# Patient Record
Sex: FEMALE | Race: WHITE | NOT HISPANIC OR LATINO | Employment: UNEMPLOYED | URBAN - METROPOLITAN AREA
[De-identification: names, ages, dates, MRNs, and addresses within clinical notes are randomized per-mention and may not be internally consistent; named-entity substitution may affect disease eponyms.]

---

## 2024-06-12 ENCOUNTER — TRANSCRIBE ORDERS (OUTPATIENT)
Dept: ADMINISTRATIVE | Facility: HOSPITAL | Age: 59
End: 2024-06-12
Payer: COMMERCIAL

## 2024-06-12 DIAGNOSIS — M54.16 LUMBAR RADICULOPATHY: Primary | ICD-10-CM

## 2024-06-13 ENCOUNTER — OFFICE VISIT (OUTPATIENT)
Dept: FAMILY MEDICINE CLINIC | Age: 59
End: 2024-06-13
Payer: COMMERCIAL

## 2024-06-13 ENCOUNTER — LAB (OUTPATIENT)
Dept: LAB | Facility: HOSPITAL | Age: 59
End: 2024-06-13
Payer: COMMERCIAL

## 2024-06-13 VITALS
WEIGHT: 117.6 LBS | DIASTOLIC BLOOD PRESSURE: 90 MMHG | BODY MASS INDEX: 20.84 KG/M2 | HEART RATE: 96 BPM | SYSTOLIC BLOOD PRESSURE: 150 MMHG | HEIGHT: 63 IN

## 2024-06-13 DIAGNOSIS — E55.9 VITAMIN D DEFICIENCY: ICD-10-CM

## 2024-06-13 DIAGNOSIS — R79.89 ELEVATED FERRITIN: ICD-10-CM

## 2024-06-13 DIAGNOSIS — E53.8 VITAMIN B12 DEFICIENCY: ICD-10-CM

## 2024-06-13 DIAGNOSIS — D45 POLYCYTHEMIA VERA: ICD-10-CM

## 2024-06-13 DIAGNOSIS — F34.1 DYSTHYMIA: Primary | ICD-10-CM

## 2024-06-13 DIAGNOSIS — R03.0 ELEVATED BLOOD PRESSURE READING: ICD-10-CM

## 2024-06-13 DIAGNOSIS — J43.9 PULMONARY EMPHYSEMA, UNSPECIFIED EMPHYSEMA TYPE: ICD-10-CM

## 2024-06-13 LAB
25(OH)D3 SERPL-MCNC: 40.4 NG/ML (ref 30–100)
BASOPHILS # BLD AUTO: 0.08 10*3/MM3 (ref 0–0.2)
BASOPHILS NFR BLD AUTO: 1.4 % (ref 0–1.5)
DEPRECATED RDW RBC AUTO: 54.7 FL (ref 37–54)
EOSINOPHIL # BLD AUTO: 0.07 10*3/MM3 (ref 0–0.4)
EOSINOPHIL NFR BLD AUTO: 1.2 % (ref 0.3–6.2)
ERYTHROCYTE [DISTWIDTH] IN BLOOD BY AUTOMATED COUNT: 13.2 % (ref 12.3–15.4)
FERRITIN SERPL-MCNC: 200 NG/ML (ref 13–150)
HCT VFR BLD AUTO: 49.8 % (ref 34–46.6)
HGB BLD-MCNC: 16.7 G/DL (ref 12–15.9)
IMM GRANULOCYTES # BLD AUTO: 0.02 10*3/MM3 (ref 0–0.05)
IMM GRANULOCYTES NFR BLD AUTO: 0.3 % (ref 0–0.5)
IRON 24H UR-MRATE: 132 MCG/DL (ref 37–145)
IRON SATN MFR SERPL: 33 % (ref 20–50)
LYMPHOCYTES # BLD AUTO: 1.45 10*3/MM3 (ref 0.7–3.1)
LYMPHOCYTES NFR BLD AUTO: 24.7 % (ref 19.6–45.3)
MCH RBC QN AUTO: 36.4 PG (ref 26.6–33)
MCHC RBC AUTO-ENTMCNC: 33.5 G/DL (ref 31.5–35.7)
MCV RBC AUTO: 108.5 FL (ref 79–97)
MONOCYTES # BLD AUTO: 0.69 10*3/MM3 (ref 0.1–0.9)
MONOCYTES NFR BLD AUTO: 11.8 % (ref 5–12)
NEUTROPHILS NFR BLD AUTO: 3.56 10*3/MM3 (ref 1.7–7)
NEUTROPHILS NFR BLD AUTO: 60.6 % (ref 42.7–76)
PLATELET # BLD AUTO: 258 10*3/MM3 (ref 140–450)
PMV BLD AUTO: 9.7 FL (ref 6–12)
RBC # BLD AUTO: 4.59 10*6/MM3 (ref 3.77–5.28)
TIBC SERPL-MCNC: 395 MCG/DL (ref 298–536)
TRANSFERRIN SERPL-MCNC: 265 MG/DL (ref 200–360)
VIT B12 BLD-MCNC: 520 PG/ML (ref 211–946)
WBC NRBC COR # BLD AUTO: 5.87 10*3/MM3 (ref 3.4–10.8)

## 2024-06-13 PROCEDURE — 82728 ASSAY OF FERRITIN: CPT

## 2024-06-13 PROCEDURE — 82607 VITAMIN B-12: CPT

## 2024-06-13 PROCEDURE — 82306 VITAMIN D 25 HYDROXY: CPT

## 2024-06-13 PROCEDURE — 85025 COMPLETE CBC W/AUTO DIFF WBC: CPT

## 2024-06-13 PROCEDURE — 36415 COLL VENOUS BLD VENIPUNCTURE: CPT

## 2024-06-13 PROCEDURE — 84466 ASSAY OF TRANSFERRIN: CPT

## 2024-06-13 PROCEDURE — 99214 OFFICE O/P EST MOD 30 MIN: CPT | Performed by: NURSE PRACTITIONER

## 2024-06-13 PROCEDURE — 83540 ASSAY OF IRON: CPT

## 2024-06-13 RX ORDER — DULOXETIN HYDROCHLORIDE 30 MG/1
30 CAPSULE, DELAYED RELEASE ORAL DAILY
Qty: 90 CAPSULE | Refills: 1 | Status: SHIPPED | OUTPATIENT
Start: 2024-06-13

## 2024-06-13 NOTE — PROGRESS NOTES
"Isha Prieto presents to [facility] with complaint of  Depression (1 month follow up )    SUBJECTIVE  History of Present Illness    Patient is here for 1 month follow-up of depression.  At her last visit, she was started on Cymbalta 30 mg daily.  This medication was also selected due to help target her chronic pain.  Patient says that it has helped her mood tremendously.  She was happier overall, not having depressive thoughts.  Seems to be sleeping somewhat better as well.  She does not see any improvement in her pain however.  Patient does seem to be more calm in office today. Blood pressure is elevated today in office.  Patient says that she smoked right before coming in to have her blood pressure checked and also drink Mountain Dew.    Patient had labs completed 1 month ago when she establish care.  Her labs showed a severe vitamin D deficiency with a level of 8.  She has started vitamin D supplementation.  She does mention that she accidentally took all 4 pills in 1 week as she did not realize she was supposed to only take medication weekly.  Patient was also found to have elevated ferritin level at 270.  Total iron was 253 and iron sat was 60%.  Her hemoglobin was 16.7 hematocrit 49.1 .4.  B12 on lower side of normal at 251.  She was started on B12 1000 mcg oral daily replacement.     Patient was also given albuterol for her shortness of air and emphysema.  Patient says that she has seen significant improvement in her breathing as well and is not experiencing as much wheezing or shortness of air.    Patient has also established with pain management, Jessi Chen.  Will be having spine MRI in the future.  Also has appointment with cardiology in July for enlarged aorta workup.      OBJECTIVE  Vital Signs:   /90   Pulse 96   Ht 160 cm (63\")   Wt 53.3 kg (117 lb 9.6 oz)   BMI 20.83 kg/m²       Physical Exam  Vitals reviewed.   Constitutional:       General: " She is not in acute distress.     Appearance: Normal appearance. She is not ill-appearing.   HENT:      Head: Normocephalic and atraumatic.      Nose: Nose normal.      Mouth/Throat:      Mouth: Mucous membranes are moist.      Pharynx: Oropharynx is clear.   Cardiovascular:      Rate and Rhythm: Normal rate and regular rhythm.      Pulses: Normal pulses.      Heart sounds: Normal heart sounds.   Pulmonary:      Effort: Pulmonary effort is normal.      Breath sounds: Normal breath sounds.   Musculoskeletal:      Cervical back: Neck supple.   Skin:     General: Skin is warm and dry.   Neurological:      General: No focal deficit present.      Mental Status: She is alert and oriented to person, place, and time. Mental status is at baseline.   Psychiatric:         Mood and Affect: Mood normal.         Behavior: Behavior normal.         Judgment: Judgment normal.          Results Review:  The following data was reviewed by DONNA Munoz [unfilled] 10:53 EDT.  Iron Profile (05/13/2024 12:21)  Ferritin (05/13/2024 12:21)  Hemoglobin A1c (05/13/2024 12:21)  Vitamin D,25-Hydroxy (05/13/2024 12:21)  Vitamin B12 & Folate (05/13/2024 12:21)  Lipid Panel (05/13/2024 12:21)  Hepatitis C Antibody (05/13/2024 12:21)  TSH Rfx On Abnormal To Free T4 (05/13/2024 12:21)  CBC & Differential (05/13/2024 12:21)  Comprehensive Metabolic Panel (05/13/2024 12:21)    ASSESSMENT AND PLAN:  Diagnoses and all orders for this visit:    1. Dysthymia (Primary)  -     DULoxetine (CYMBALTA) 30 MG capsule; Take 1 capsule by mouth Daily.  Dispense: 90 capsule; Refill: 1    2. Elevated ferritin  -     CBC & Differential; Future  -     Ferritin; Future  -     Iron Profile; Future    3. Polycythemia vera  -     CBC & Differential; Future  -     Ferritin; Future  -     Iron Profile; Future    4. Vitamin B12 deficiency  -     Vitamin B12; Future    5. Vitamin D deficiency  -     Vitamin D 25 hydroxy; Future    6. Elevated blood pressure reading    7.  Pulmonary emphysema, unspecified emphysema type      Patient's dysthymia has improved.  She was given refills of Cymbalta.  We are rechecking CBC, ferritin, and iron levels today.  Patient understands that if these are still elevated, she will be referred to hematology for further workup and management.  Also rechecking vitamin B12 and vitamin D to see if these are trending upwards.  Blood pressure elevation may be due to nicotine and caffeine that she consumed right before her appointment.  Encouraged to monitor blood pressure at home.  If consistently greater than 135/85, she will follow-up in office sooner.  Breathing improved with albuterol, continue medication and follow-up sooner as needed if albuterol is not as effective as it currently is.  Otherwise, we will plan to follow-up in 6 months for reassessment of her depression and emphysema.      Follow Up   Return in about 6 months (around 12/13/2024). Patient to notify office with any acute concerns or issues.  Patient verbalizes understanding, agrees with plan of care and has no further questions upon discharge.     Patient was given instructions and counseling regarding her condition or for health maintenance advice. Please see specific information pulled into the AVS if appropriate.     Discussed the importance of following up with any needed screening tests/labs/specialist appointments and any requested follow-up recommended by me today. Importance of maintaining follow-up discussed and patient accepts that missed appointments can delay diagnosis and potentially lead to worsening of conditions.    Part of this note may be an electronic transcription/translation of spoken language to printed text using the Dragon Dictation System.

## 2024-06-27 ENCOUNTER — TELEPHONE (OUTPATIENT)
Dept: FAMILY MEDICINE CLINIC | Age: 59
End: 2024-06-27
Payer: COMMERCIAL

## 2024-06-27 NOTE — TELEPHONE ENCOUNTER
MRI L-spine ordered by pcp on 5/14/24 pt did not complete this. Pt has an MRI L-spine ordered by Jessi Chen on 6/28/24 and per referrals pt does not wish to complete MRI Thoracic Spine. Okay to cancel our orders?

## 2024-06-28 ENCOUNTER — HOSPITAL ENCOUNTER (OUTPATIENT)
Dept: MRI IMAGING | Facility: HOSPITAL | Age: 59
Discharge: HOME | End: 2024-06-28
Admitting: PHYSICIAN ASSISTANT
Payer: COMMERCIAL

## 2024-06-28 DIAGNOSIS — M54.16 LUMBAR RADICULOPATHY: ICD-10-CM

## 2024-06-28 PROCEDURE — 72148 MRI LUMBAR SPINE W/O DYE: CPT

## 2024-07-09 ENCOUNTER — TRANSCRIBE ORDERS (OUTPATIENT)
Dept: ADMINISTRATIVE | Facility: HOSPITAL | Age: 59
End: 2024-07-09
Payer: COMMERCIAL

## 2024-07-09 DIAGNOSIS — I73.9 PERIPHERAL VASCULAR DISEASE, UNSPECIFIED: Primary | ICD-10-CM

## 2024-09-07 DIAGNOSIS — E55.9 VITAMIN D DEFICIENCY: ICD-10-CM

## 2024-09-09 RX ORDER — ERGOCALCIFEROL 1.25 MG/1
50000 CAPSULE, LIQUID FILLED ORAL
Qty: 4 CAPSULE | Refills: 3 | OUTPATIENT
Start: 2024-09-09

## 2024-09-25 ENCOUNTER — OFFICE VISIT (OUTPATIENT)
Dept: CARDIOLOGY | Facility: CLINIC | Age: 59
End: 2024-09-25
Payer: COMMERCIAL

## 2024-09-25 VITALS
DIASTOLIC BLOOD PRESSURE: 91 MMHG | SYSTOLIC BLOOD PRESSURE: 143 MMHG | HEART RATE: 101 BPM | BODY MASS INDEX: 20.55 KG/M2 | WEIGHT: 116 LBS | HEIGHT: 63 IN

## 2024-09-25 DIAGNOSIS — I77.89 ENLARGED AORTA: Primary | ICD-10-CM

## 2024-09-25 RX ORDER — HYDROCODONE BITARTRATE AND ACETAMINOPHEN 5; 325 MG/1; MG/1
TABLET ORAL
COMMUNITY
Start: 2024-08-08

## 2024-09-25 RX ORDER — ASPIRIN 81 MG/1
81 TABLET ORAL DAILY
COMMUNITY

## 2024-09-25 RX ORDER — BACLOFEN 10 MG/1
10 TABLET ORAL 3 TIMES DAILY
COMMUNITY
Start: 2024-08-22

## 2024-12-31 ENCOUNTER — OFFICE VISIT (OUTPATIENT)
Dept: FAMILY MEDICINE CLINIC | Age: 59
End: 2024-12-31
Payer: COMMERCIAL

## 2024-12-31 VITALS
HEART RATE: 116 BPM | WEIGHT: 113 LBS | BODY MASS INDEX: 20.02 KG/M2 | DIASTOLIC BLOOD PRESSURE: 126 MMHG | OXYGEN SATURATION: 94 % | HEIGHT: 63 IN | SYSTOLIC BLOOD PRESSURE: 155 MMHG | TEMPERATURE: 98.8 F

## 2024-12-31 DIAGNOSIS — F10.10 ALCOHOL ABUSE: ICD-10-CM

## 2024-12-31 DIAGNOSIS — I77.89 ENLARGED AORTA: ICD-10-CM

## 2024-12-31 DIAGNOSIS — I10 PRIMARY HYPERTENSION: ICD-10-CM

## 2024-12-31 DIAGNOSIS — E53.8 LOW SERUM VITAMIN B12: ICD-10-CM

## 2024-12-31 DIAGNOSIS — Z53.20 SCREENING MAMMOGRAPHY DECLINED: ICD-10-CM

## 2024-12-31 DIAGNOSIS — Z72.0 TOBACCO ABUSE: ICD-10-CM

## 2024-12-31 DIAGNOSIS — Z53.20 CERVICAL CANCER SCREENING DECLINED: ICD-10-CM

## 2024-12-31 DIAGNOSIS — D58.2 ELEVATED HEMOGLOBIN: ICD-10-CM

## 2024-12-31 DIAGNOSIS — Z53.20 LUNG CANCER SCREENING DECLINED BY PATIENT: ICD-10-CM

## 2024-12-31 DIAGNOSIS — F34.1 DYSTHYMIA: ICD-10-CM

## 2024-12-31 DIAGNOSIS — Z00.00 ANNUAL PHYSICAL EXAM: Primary | ICD-10-CM

## 2024-12-31 DIAGNOSIS — Z53.20 COLON CANCER SCREENING DECLINED: ICD-10-CM

## 2024-12-31 DIAGNOSIS — J43.9 PULMONARY EMPHYSEMA, UNSPECIFIED EMPHYSEMA TYPE: ICD-10-CM

## 2024-12-31 DIAGNOSIS — R53.83 OTHER FATIGUE: ICD-10-CM

## 2024-12-31 DIAGNOSIS — G89.4 CHRONIC PAIN SYNDROME: ICD-10-CM

## 2024-12-31 DIAGNOSIS — G89.29 CHRONIC MIDLINE BACK PAIN, UNSPECIFIED BACK LOCATION: ICD-10-CM

## 2024-12-31 DIAGNOSIS — E55.9 VITAMIN D DEFICIENCY: ICD-10-CM

## 2024-12-31 DIAGNOSIS — M54.9 CHRONIC MIDLINE BACK PAIN, UNSPECIFIED BACK LOCATION: ICD-10-CM

## 2024-12-31 DIAGNOSIS — M43.06 PARS DEFECT OF LUMBAR SPINE: ICD-10-CM

## 2024-12-31 PROCEDURE — 99396 PREV VISIT EST AGE 40-64: CPT | Performed by: NURSE PRACTITIONER

## 2024-12-31 PROCEDURE — 99214 OFFICE O/P EST MOD 30 MIN: CPT | Performed by: NURSE PRACTITIONER

## 2024-12-31 RX ORDER — SERTRALINE HYDROCHLORIDE 25 MG/1
25 TABLET, FILM COATED ORAL DAILY
Qty: 90 TABLET | Refills: 1 | Status: SHIPPED | OUTPATIENT
Start: 2024-12-31

## 2024-12-31 RX ORDER — LOSARTAN POTASSIUM 25 MG/1
25 TABLET ORAL DAILY
Qty: 30 TABLET | Refills: 0 | Status: SHIPPED | OUTPATIENT
Start: 2024-12-31

## 2024-12-31 RX ORDER — ERGOCALCIFEROL 1.25 MG/1
50000 CAPSULE, LIQUID FILLED ORAL
Qty: 4 CAPSULE | Refills: 4 | Status: SHIPPED | OUTPATIENT
Start: 2024-12-31

## 2024-12-31 RX ORDER — LANOLIN ALCOHOL/MO/W.PET/CERES
1000 CREAM (GRAM) TOPICAL DAILY
Qty: 90 TABLET | Refills: 1 | Status: SHIPPED | OUTPATIENT
Start: 2024-12-31

## 2024-12-31 NOTE — ASSESSMENT & PLAN NOTE
Recommend patient proceed with echocardiogram as recommended by cardiologist.  We can repeat chest CT in 2 years to reevaluate size of enlarged aorta

## 2024-12-31 NOTE — ASSESSMENT & PLAN NOTE
Patient self stopped Cymbalta, she will start Zoloft.  Educated on medication side effects.  She understands it may take several weeks to see improvement with this medication.  Will reassess at follow-up

## 2024-12-31 NOTE — PROGRESS NOTES
Isha Prieto presents to [facility] with complaint of  Pulmonary emphysema (Annual follow up )    SUBJECTIVE  History of Present Illness    Patient is here for annual physical exam and follow-up of chronic medical conditions of pulmonary emphysema, depression, chronic back pain, tobacco and alcohol abuse.  She is present with her daughter today.    Since she was last seen here in office, she has been evaluated by hematology for elevated ferritin and red blood cell counts.  Her hematological workup is negative, concluded that her polycythemia is due to dehydration, alcohol use, tobacco abuse.  No further workup or follow-ups with hematology needed.    She has also been seen by Dr. Osman with cardiology for enlarged aorta.  Echocardiogram was ordered for further evaluation however patient canceled the appointment.  Dilation of ascending aorta was seen on CT scan April 2024.  Cardiology recommended follow-up with CT in 2 years for reassessment.  Depending on echocardiogram result, patient would be able to follow-up with cardiology just as needed.    Lastly, patient is asking for referral to [facility] pain management.  Patient says that she was discharged from [facility] pain management.  Patient says that she was short on her pill count.  She was prescribed Norco 5-325 mg 1 tablet twice per day per patient.    Patient also has severe vitamin D deficiency and vitamin B12 deficiency.  She is needing these medications refilled today.    Patient's blood pressure is also again elevated.  Patient has been hypertensive as several medical office appointments for the past 6 months.  She does report being on blood pressure medication in the past.    Patient was also started on Cymbalta 6 months ago.  She was started on Cymbalta for depression and to also help with her chronic pain.  Patient says that she no longer wants to take this medication as she saw that it was recalled.  She also  "feels that the medication did not work overly well for her.  She would like to try different medication.  Denies any thoughts of suicide or self-harm.    The following portions of the patient's history were reviewed and updated as appropriate: allergies, current medications, past family history, past medical history, past social history, past surgical history and problem list.  Patient has never had screening mammogram or colonoscopy.  He is due for Pap smear as well.    OBJECTIVE  Vital Signs:   BP (!) 155/126 (BP Location: Left arm, Patient Position: Sitting, Cuff Size: Adult)   Pulse 116   Temp 98.8 °F (37.1 °C) (Oral)   Ht 160 cm (63\")   Wt 51.3 kg (113 lb)   SpO2 94%   BMI 20.02 kg/m²       Physical Exam  Vitals reviewed.   Constitutional:       General: She is not in acute distress.     Appearance: Normal appearance. She is not ill-appearing.   HENT:      Head: Normocephalic and atraumatic.      Nose: Nose normal.      Mouth/Throat:      Mouth: Mucous membranes are moist.      Pharynx: Oropharynx is clear.   Cardiovascular:      Rate and Rhythm: Normal rate and regular rhythm.      Pulses: Normal pulses.      Heart sounds: Normal heart sounds.   Pulmonary:      Effort: Pulmonary effort is normal.      Breath sounds: Normal breath sounds.   Musculoskeletal:      Cervical back: Neck supple.   Skin:     General: Skin is warm and dry.   Neurological:      General: No focal deficit present.      Mental Status: She is alert and oriented to person, place, and time. Mental status is at baseline.   Psychiatric:         Mood and Affect: Mood normal.         Behavior: Behavior normal.         Judgment: Judgment normal.              ASSESSMENT AND PLAN:  Diagnoses and all orders for this visit:    1. Annual physical exam (Primary)    2. Chronic pain syndrome  -     Ambulatory Referral to Pain Management    3. Primary hypertension  Assessment & Plan:  Newly identified, starting losartan 25 mg daily.  Smoking " cessation encouraged.  Amatory blood pressure monitoring encouraged, bring log to follow-up.  She will come back to the office in 3 weeks for reassessment of her hypertension    Orders:  -     losartan (COZAAR) 25 MG tablet; Take 1 tablet by mouth Daily.  Dispense: 30 tablet; Refill: 0    4. Vitamin D deficiency  Assessment & Plan:  Refilled vitamin D, due for lab check in 6 months    Orders:  -     vitamin D (ERGOCALCIFEROL) 1.25 MG (44629 UT) capsule capsule; Take 1 capsule by mouth Every 7 (Seven) Days.  Dispense: 4 capsule; Refill: 4    5. Other fatigue  -     vitamin B-12 (CYANOCOBALAMIN) 1000 MCG tablet; Take 1 tablet by mouth Daily.  Dispense: 90 tablet; Refill: 1    6. Low serum vitamin B12  Assessment & Plan:  Refilled, due for vitamin B12 check at follow-up in 6 months    Orders:  -     vitamin B-12 (CYANOCOBALAMIN) 1000 MCG tablet; Take 1 tablet by mouth Daily.  Dispense: 90 tablet; Refill: 1    7. Enlarged aorta  Assessment & Plan:  Recommend patient proceed with echocardiogram as recommended by cardiologist.  We can repeat chest CT in 2 years to reevaluate size of enlarged aorta      8. Alcohol abuse  Assessment & Plan:  Patient is no longer drinking alcohol daily.  Reports that she uses alcohol on occasion      9. Dysthymia  Assessment & Plan:  Patient self stopped Cymbalta, she will start Zoloft.  Educated on medication side effects.  She understands it may take several weeks to see improvement with this medication.  Will reassess at follow-up    Orders:  -     sertraline (Zoloft) 25 MG tablet; Take 1 tablet by mouth Daily.  Dispense: 90 tablet; Refill: 1    10. Chronic midline back pain, unspecified back location  Assessment & Plan:  Discussed with patient that [facility] pain management will likely not take transfer of care since she was discharged from [facility], for this reason we will see if we can get [facility] into [facility].      11. Pars defect of lumbar spine    12. Pulmonary emphysema,  unspecified emphysema type  Assessment & Plan:  Has improved since starting albuterol, refills not needed today      13. Tobacco abuse    14. Colon cancer screening declined    15. Screening mammography declined    16. Cervical cancer screening declined    17. Lung cancer screening declined by patient    18. Elevated hemoglobin  Assessment & Plan:  Hematology has ruled out blood illness, secondary to alcohol use dehydration and smoking.        Discussed with patient importance of routine screenings for lung, colon and breast cancer but she ultimately declines.  She declines routine vaccinations as well.  Discussed importance of eating a healthy diet, smoking cessation, avoiding drugs and alcohol, regular exercise, and seatbelt safety.    Isha Prieto  reports that she has been smoking cigarettes. She started smoking about 51 years ago. She has a 102 pack-year smoking history. She has never used smokeless tobacco. I have educated her on the risk of diseases from using tobacco products such as cancer, COPD, heart disease, and arterial disease.     I advised her to quit and she is not willing to quit.    I spent 3  minutes counseling the patient.             Follow Up   Return in about 3 weeks (around 1/21/2025). Patient to notify office with any acute concerns or issues.  Patient verbalizes understanding, agrees with plan of care and has no further questions upon discharge.     Patient was given instructions and counseling regarding her condition or for health maintenance advice. Please see specific information pulled into the AVS if appropriate.     Discussed the importance of following up with any needed screening tests/labs/specialist appointments and any requested follow-up recommended by me today. Importance of maintaining follow-up discussed and patient accepts that missed appointments can delay diagnosis and potentially lead to worsening of conditions.    Part of this note may be an electronic  transcription/translation of spoken language to printed text using the Dragon Dictation System.

## 2024-12-31 NOTE — ASSESSMENT & PLAN NOTE
Discussed with patient that [facility] pain management will likely not take transfer of care since she was discharged from [facility], for this reason we will see if we can get [facility] into [facility].

## 2024-12-31 NOTE — ASSESSMENT & PLAN NOTE
Newly identified, starting losartan 25 mg daily.  Smoking cessation encouraged.  Amatory blood pressure monitoring encouraged, bring log to follow-up.  She will come back to the office in 3 weeks for reassessment of her hypertension

## 2025-02-04 ENCOUNTER — TRANSCRIBE ORDERS (OUTPATIENT)
Dept: ADMINISTRATIVE | Facility: HOSPITAL | Age: 60
End: 2025-02-04
Payer: MEDICARE

## 2025-02-04 DIAGNOSIS — M47.812 CERVICAL SPONDYLOSIS: Primary | ICD-10-CM

## 2025-02-27 ENCOUNTER — OFFICE VISIT (OUTPATIENT)
Dept: FAMILY MEDICINE CLINIC | Age: 60
End: 2025-02-27
Payer: COMMERCIAL

## 2025-02-27 VITALS
DIASTOLIC BLOOD PRESSURE: 99 MMHG | TEMPERATURE: 98.3 F | OXYGEN SATURATION: 94 % | HEART RATE: 96 BPM | HEIGHT: 63 IN | BODY MASS INDEX: 20.2 KG/M2 | SYSTOLIC BLOOD PRESSURE: 169 MMHG | WEIGHT: 114 LBS

## 2025-02-27 DIAGNOSIS — I10 PRIMARY HYPERTENSION: Primary | ICD-10-CM

## 2025-02-27 DIAGNOSIS — E53.8 LOW SERUM VITAMIN B12: ICD-10-CM

## 2025-02-27 DIAGNOSIS — E55.9 VITAMIN D DEFICIENCY: ICD-10-CM

## 2025-02-27 DIAGNOSIS — J43.9 PULMONARY EMPHYSEMA, UNSPECIFIED EMPHYSEMA TYPE: ICD-10-CM

## 2025-02-27 DIAGNOSIS — Z72.0 TOBACCO ABUSE: ICD-10-CM

## 2025-02-27 PROCEDURE — 99214 OFFICE O/P EST MOD 30 MIN: CPT | Performed by: NURSE PRACTITIONER

## 2025-02-27 RX ORDER — HYDROCODONE BITARTRATE AND ACETAMINOPHEN 7.5; 325 MG/1; MG/1
1 TABLET ORAL EVERY 12 HOURS PRN
COMMUNITY

## 2025-02-27 RX ORDER — HYDROCODONE BITARTRATE AND ACETAMINOPHEN 5; 325 MG/1; MG/1
1 TABLET ORAL EVERY 12 HOURS PRN
COMMUNITY
End: 2025-02-27

## 2025-02-27 RX ORDER — LOSARTAN POTASSIUM 50 MG/1
50 TABLET ORAL DAILY
Qty: 30 TABLET | Refills: 0 | Status: SHIPPED | OUTPATIENT
Start: 2025-02-27

## 2025-02-27 NOTE — PROGRESS NOTES
"Isha Prieto presents to [facility] with complaint of  Hypertension (Follow up since starting losartan )    SUBJECTIVE  History of Present Illness    Patient is here for follow up of HTN.  At her last visit, she was started on losartan 25 mg daily.  Patient was supposed to follow-up 3 weeks after that appointment however that has been 2 months ago.  As a result, patient is ran out of her medication.  Patient says that her blood pressure was not controlled regardless even when she was taking losartan.  She does not endorse any negative side effects from the medication.  Fortunately, patient continues to smoke.  Patient says that the Zoloft is causing her to have a headache.  She feels that her depression is fine and does not wish to be on any other antidepressants at this time.  She stopped taking the medication over a month ago.      OBJECTIVE  Vital Signs:   /99 (BP Location: Right arm, Patient Position: Sitting, Cuff Size: Adult)   Pulse 96   Temp 98.3 °F (36.8 °C) (Oral)   Ht 160 cm (63\")   Wt 51.7 kg (114 lb)   SpO2 94%   BMI 20.19 kg/m²       Physical Exam  Vitals reviewed.   Constitutional:       General: She is not in acute distress.     Appearance: Normal appearance. She is not ill-appearing.   HENT:      Head: Normocephalic and atraumatic.      Nose: Nose normal.      Mouth/Throat:      Mouth: Mucous membranes are moist.      Pharynx: Oropharynx is clear.   Cardiovascular:      Rate and Rhythm: Normal rate and regular rhythm.      Pulses: Normal pulses.      Heart sounds: Normal heart sounds.   Pulmonary:      Effort: Pulmonary effort is normal.      Breath sounds: Normal breath sounds.   Musculoskeletal:      Cervical back: Neck supple.   Skin:     General: Skin is warm and dry.   Neurological:      General: No focal deficit present.      Mental Status: She is alert and oriented to person, place, and time. Mental status is at baseline.   Psychiatric:         " Mood and Affect: Mood normal.         Behavior: Behavior normal.         Judgment: Judgment normal.              ASSESSMENT AND PLAN:  Diagnoses and all orders for this visit:    1. Primary hypertension (Primary)    2. Pulmonary emphysema, unspecified emphysema type    3. Tobacco abuse    4. Vitamin D deficiency    5. Low serum vitamin B12    Other orders  -     losartan (COZAAR) 50 MG tablet; Take 1 tablet by mouth Daily.  Dispense: 30 tablet; Refill: 0      Will increase losartan to 50 mg daily.  Patient was encouraged to maintain her 3-week follow-up as scheduled for her today.  Smoking cessation encouraged.  Her emphysema is well-controlled with albuterol.  She is on vitamin D and vitamin B12 replacement.  Recent labs are up-to-date and showed normal levels.      Follow Up   Return in about 3 weeks (around 3/20/2025). Patient to notify office with any acute concerns or issues.  Patient verbalizes understanding, agrees with plan of care and has no further questions upon discharge.     Patient was given instructions and counseling regarding her condition or for health maintenance advice. Please see specific information pulled into the AVS if appropriate.     Discussed the importance of following up with any needed screening tests/labs/specialist appointments and any requested follow-up recommended by me today. Importance of maintaining follow-up discussed and patient accepts that missed appointments can delay diagnosis and potentially lead to worsening of conditions.    Part of this note may be an electronic transcription/translation of spoken language to printed text using the Dragon Dictation System.

## 2025-03-18 ENCOUNTER — OFFICE VISIT (OUTPATIENT)
Dept: FAMILY MEDICINE CLINIC | Age: 60
End: 2025-03-18
Payer: MEDICARE

## 2025-03-18 VITALS
TEMPERATURE: 97.9 F | HEIGHT: 63 IN | DIASTOLIC BLOOD PRESSURE: 84 MMHG | SYSTOLIC BLOOD PRESSURE: 152 MMHG | BODY MASS INDEX: 20.02 KG/M2 | WEIGHT: 113 LBS | HEART RATE: 87 BPM | OXYGEN SATURATION: 97 %

## 2025-03-18 DIAGNOSIS — Z53.20 COLON CANCER SCREENING DECLINED: ICD-10-CM

## 2025-03-18 DIAGNOSIS — G89.29 CHRONIC MIDLINE BACK PAIN, UNSPECIFIED BACK LOCATION: ICD-10-CM

## 2025-03-18 DIAGNOSIS — Z72.0 TOBACCO ABUSE: ICD-10-CM

## 2025-03-18 DIAGNOSIS — Z53.20 LUNG CANCER SCREENING DECLINED BY PATIENT: ICD-10-CM

## 2025-03-18 DIAGNOSIS — I10 PRIMARY HYPERTENSION: Primary | ICD-10-CM

## 2025-03-18 DIAGNOSIS — M54.9 CHRONIC MIDLINE BACK PAIN, UNSPECIFIED BACK LOCATION: ICD-10-CM

## 2025-03-18 DIAGNOSIS — Z53.20 SCREENING MAMMOGRAPHY DECLINED: ICD-10-CM

## 2025-03-18 DIAGNOSIS — Z53.20 CERVICAL CANCER SCREENING DECLINED: ICD-10-CM

## 2025-03-18 DIAGNOSIS — G89.4 CHRONIC PAIN SYNDROME: ICD-10-CM

## 2025-03-18 DIAGNOSIS — J43.9 PULMONARY EMPHYSEMA, UNSPECIFIED EMPHYSEMA TYPE: ICD-10-CM

## 2025-03-18 RX ORDER — LOSARTAN POTASSIUM 100 MG/1
100 TABLET ORAL DAILY
Qty: 90 TABLET | Refills: 0 | Status: SHIPPED | OUTPATIENT
Start: 2025-03-18

## 2025-03-18 NOTE — PROGRESS NOTES
"Isha Prieto presents to [facility] with complaint of  Hypertension (3 week f/u since increase in losartan dosage )    SUBJECTIVE  History of Present Illness    Patient is here for follow-up of hypertension.  At her last office visit, her losartan was increased to 50 mg daily from 25 mg daily.  Blood pressure today is 152/84, prior office visit her blood pressure was 169/99. Patient is still smoking with no desire to quit.     Patient is also followed by hematology and pain management.      OBJECTIVE  Vital Signs:   /84 (BP Location: Left arm, Patient Position: Sitting, Cuff Size: Adult)   Pulse 87   Temp 97.9 °F (36.6 °C) (Oral)   Ht 160 cm (63\")   Wt 51.3 kg (113 lb)   SpO2 97%   BMI 20.02 kg/m²       Physical Exam  Vitals reviewed.   Constitutional:       General: She is not in acute distress.     Appearance: Normal appearance. She is not ill-appearing.   HENT:      Head: Normocephalic and atraumatic.      Nose: Nose normal.      Mouth/Throat:      Mouth: Mucous membranes are moist.      Pharynx: Oropharynx is clear.   Cardiovascular:      Rate and Rhythm: Normal rate and regular rhythm.      Pulses: Normal pulses.      Heart sounds: Normal heart sounds.   Pulmonary:      Effort: Pulmonary effort is normal.      Breath sounds: Normal breath sounds.   Musculoskeletal:      Cervical back: Neck supple.   Skin:     General: Skin is warm and dry.   Neurological:      General: No focal deficit present.      Mental Status: She is alert and oriented to person, place, and time. Mental status is at baseline.   Psychiatric:         Mood and Affect: Mood normal.         Behavior: Behavior normal.         Judgment: Judgment normal.              ASSESSMENT AND PLAN:  Diagnoses and all orders for this visit:    1. Primary hypertension (Primary)  -     losartan (COZAAR) 100 MG tablet; Take 1 tablet by mouth Daily.  Dispense: 90 tablet; Refill: 0    2. Tobacco abuse    3. Pulmonary " emphysema, unspecified emphysema type    4. Chronic midline back pain, unspecified back location    5. Chronic pain syndrome    6. Colon cancer screening declined    7. Screening mammography declined    8. Cervical cancer screening declined    9. Lung cancer screening declined by patient      Will increase losartan to 100 mg daily.  She will have blood pressure reassessed in 3 months.  Encouraged to quit smoking and monitor blood pressure at home.  She again declines all preventative screenings.  Educated on importance of these.  Continue seeing pain management.  No other medication changes made today.          Follow Up   Return in about 3 months (around 6/18/2025).  Discussed with patient that I am transferring to [facility] primary care office in [place].  For this reason, she was scheduled a 3-month visit with Dr. Minor to establish care and for blood pressure follow-up.  Patient to notify office with any acute concerns or issues.  Patient verbalizes understanding, agrees with plan of care and has no further questions upon discharge.     Patient was given instructions and counseling regarding her condition or for health maintenance advice. Please see specific information pulled into the AVS if appropriate.     Discussed the importance of following up with any needed screening tests/labs/specialist appointments and any requested follow-up recommended by me today. Importance of maintaining follow-up discussed and patient accepts that missed appointments can delay diagnosis and potentially lead to worsening of conditions.    Part of this note may be an electronic transcription/translation of spoken language to printed text using the Dragon Dictation System.

## 2025-03-29 DIAGNOSIS — J43.9 PULMONARY EMPHYSEMA, UNSPECIFIED EMPHYSEMA TYPE: ICD-10-CM

## 2025-03-31 RX ORDER — ALBUTEROL SULFATE 90 UG/1
2 INHALANT RESPIRATORY (INHALATION) EVERY 4 HOURS PRN
Qty: 8.5 G | Refills: 1 | Status: SHIPPED | OUTPATIENT
Start: 2025-03-31